# Patient Record
Sex: MALE | NOT HISPANIC OR LATINO | Employment: OTHER | URBAN - METROPOLITAN AREA
[De-identification: names, ages, dates, MRNs, and addresses within clinical notes are randomized per-mention and may not be internally consistent; named-entity substitution may affect disease eponyms.]

---

## 2018-05-08 ENCOUNTER — OFFICE VISIT (OUTPATIENT)
Dept: GASTROENTEROLOGY | Facility: CLINIC | Age: 63
End: 2018-05-08

## 2018-05-08 VITALS
WEIGHT: 199.6 LBS | HEIGHT: 67 IN | DIASTOLIC BLOOD PRESSURE: 88 MMHG | TEMPERATURE: 97.8 F | BODY MASS INDEX: 31.33 KG/M2 | SYSTOLIC BLOOD PRESSURE: 142 MMHG

## 2018-05-08 DIAGNOSIS — R12 HEARTBURN: Primary | ICD-10-CM

## 2018-05-08 DIAGNOSIS — R11.2 NON-INTRACTABLE VOMITING WITH NAUSEA, UNSPECIFIED VOMITING TYPE: ICD-10-CM

## 2018-05-08 DIAGNOSIS — R68.81 EARLY SATIETY: ICD-10-CM

## 2018-05-08 PROCEDURE — 99204 OFFICE O/P NEW MOD 45 MIN: CPT | Performed by: INTERNAL MEDICINE

## 2018-05-08 RX ORDER — CELECOXIB 200 MG/1
CAPSULE ORAL
COMMUNITY
Start: 2017-08-30

## 2018-05-08 RX ORDER — LANSOPRAZOLE 30 MG/1
30 CAPSULE, DELAYED RELEASE ORAL
COMMUNITY
Start: 2017-08-28 | End: 2018-06-07 | Stop reason: HOSPADM

## 2018-05-08 NOTE — PROGRESS NOTES
Chief Complaint   Patient presents with   • Heartburn       Pastor Marion is a 63 y.o. male who presents with GERD refractory to once a day Prevacid.  History of esophagitis in 10 years ago    C/s 3  years ago normal      Heartburn   He complains of abdominal pain, belching, chest pain, coughing, early satiety, heartburn, a hoarse voice, nausea and water brash. This is a chronic (Worsening recently) problem. The current episode started more than 1 year ago. The problem occurs frequently. The problem has been gradually worsening. The heartburn duration is an hour. The heartburn is located in the substernum. The heartburn is of moderate intensity. The heartburn wakes him from sleep. The heartburn limits his activity. The heartburn changes with position. The symptoms are aggravated by certain foods, exertion, lying down and bending. Pertinent negatives include no anemia, fatigue, melena or weight loss. There are no known risk factors. He has tried head elevation, a diet change, an antacid and a PPI for the symptoms. The treatment provided mild relief. Past procedures include an EGD. EGD 10 years ago with esophagitis and hiatal hernia. Past invasive treatments do not include reflux surgery.       Past Medical History:   Diagnosis Date   • Arthritis    • Heartburn        Past Surgical History:   Procedure Laterality Date   • CARPAL TUNNEL RELEASE     • FACIAL FRACTURE SURGERY     • HAND SURGERY     • HERNIA REPAIR     • SHOULDER SURGERY Right    • TOTAL HIP ARTHROPLASTY           Current Outpatient Prescriptions:   •  aspirin 81 MG tablet, Take 81 mg by mouth., Disp: , Rfl:   •  celecoxib (CeleBREX) 200 MG capsule, TAKE 1 CAPSULE TWICE A DAY (USE SPARINGLY), Disp: , Rfl:   •  lansoprazole (PREVACID) 30 MG capsule, Take 30 mg by mouth., Disp: , Rfl:     No Known Allergies    Social History     Social History   • Marital status:      Spouse name: N/A   • Number of children: N/A   • Years of education: N/A      Occupational History   • Not on file.     Social History Main Topics   • Smoking status: Never Smoker   • Smokeless tobacco: Not on file   • Alcohol use Not on file   • Drug use: Unknown   • Sexual activity: Not on file     Other Topics Concern   • Not on file     Social History Narrative   • No narrative on file       History reviewed. No pertinent family history.    Review of Systems   Constitutional: Negative for fatigue and weight loss.   HENT: Positive for hoarse voice.    Respiratory: Positive for cough.    Cardiovascular: Positive for chest pain.   Gastrointestinal: Positive for abdominal pain, heartburn, nausea and vomiting. Negative for anal bleeding, blood in stool, constipation, diarrhea and melena.   All other systems reviewed and are negative.      Vitals:    05/08/18 1042   BP: 142/88   Temp: 97.8 °F (36.6 °C)       Physical Exam   Constitutional: He is oriented to person, place, and time. He appears well-developed and well-nourished.   HENT:   Head: Normocephalic and atraumatic.   Eyes: Conjunctivae and EOM are normal.   Neck: Normal range of motion. No tracheal deviation present.   Cardiovascular: Normal rate and regular rhythm.    Pulmonary/Chest: Effort normal and breath sounds normal. No respiratory distress.   Abdominal: Soft. Bowel sounds are normal. He exhibits no distension and no mass. There is no tenderness. There is no rebound and no guarding.   Musculoskeletal: Normal range of motion.   Neurological: He is alert and oriented to person, place, and time.   Skin: Skin is warm and dry.   Psychiatric: He has a normal mood and affect. Judgment normal.   Nursing note and vitals reviewed.      No images are attached to the encounter.    Problem list    GERD refractory to lansoprazole every morning  Esophagitis 10 years ago  Hiatal hernia      Assessment/Plan    An for EGD in the near future  He will have a stress tests by the cardiology team at week we will await the results of that before  performing EGD  EGD 1 month  Twice a day PPI to be used for the next month I've given him samples of dexilant 60 mg for his evening dose    I have also given him a coupon card we may try to prescribe dexilant to him in the near future as he tells me on once a day dexilant he was symptom-free last year

## 2018-06-07 ENCOUNTER — ANESTHESIA (OUTPATIENT)
Dept: GASTROENTEROLOGY | Facility: HOSPITAL | Age: 63
End: 2018-06-07

## 2018-06-07 ENCOUNTER — ANESTHESIA EVENT (OUTPATIENT)
Dept: GASTROENTEROLOGY | Facility: HOSPITAL | Age: 63
End: 2018-06-07

## 2018-06-07 ENCOUNTER — HOSPITAL ENCOUNTER (OUTPATIENT)
Facility: HOSPITAL | Age: 63
Setting detail: HOSPITAL OUTPATIENT SURGERY
Discharge: HOME | End: 2018-06-07
Attending: INTERNAL MEDICINE | Admitting: INTERNAL MEDICINE

## 2018-06-07 VITALS
HEART RATE: 67 BPM | BODY MASS INDEX: 30.45 KG/M2 | SYSTOLIC BLOOD PRESSURE: 114 MMHG | DIASTOLIC BLOOD PRESSURE: 75 MMHG | WEIGHT: 194 LBS | OXYGEN SATURATION: 98 % | RESPIRATION RATE: 16 BRPM | TEMPERATURE: 98.3 F | HEIGHT: 67 IN

## 2018-06-07 PROCEDURE — 43235 EGD DIAGNOSTIC BRUSH WASH: CPT | Performed by: INTERNAL MEDICINE

## 2018-06-07 PROCEDURE — 25010000002 PROPOFOL 10 MG/ML EMULSION: Performed by: ANESTHESIOLOGY

## 2018-06-07 RX ORDER — LIDOCAINE HYDROCHLORIDE 20 MG/ML
INJECTION, SOLUTION INFILTRATION; PERINEURAL AS NEEDED
Status: DISCONTINUED | OUTPATIENT
Start: 2018-06-07 | End: 2018-06-07 | Stop reason: SURG

## 2018-06-07 RX ORDER — DEXLANSOPRAZOLE 60 MG/1
60 CAPSULE, DELAYED RELEASE ORAL DAILY
COMMUNITY
End: 2018-06-07 | Stop reason: HOSPADM

## 2018-06-07 RX ORDER — PROPOFOL 10 MG/ML
VIAL (ML) INTRAVENOUS CONTINUOUS PRN
Status: DISCONTINUED | OUTPATIENT
Start: 2018-06-07 | End: 2018-06-07 | Stop reason: SURG

## 2018-06-07 RX ORDER — SODIUM CHLORIDE, SODIUM LACTATE, POTASSIUM CHLORIDE, CALCIUM CHLORIDE 600; 310; 30; 20 MG/100ML; MG/100ML; MG/100ML; MG/100ML
30 INJECTION, SOLUTION INTRAVENOUS CONTINUOUS PRN
Status: DISCONTINUED | OUTPATIENT
Start: 2018-06-07 | End: 2018-06-07 | Stop reason: HOSPADM

## 2018-06-07 RX ORDER — PANTOPRAZOLE SODIUM 40 MG/1
40 TABLET, DELAYED RELEASE ORAL 2 TIMES DAILY
Qty: 60 TABLET | Refills: 3 | Status: SHIPPED | OUTPATIENT
Start: 2018-06-07

## 2018-06-07 RX ORDER — PROPOFOL 10 MG/ML
VIAL (ML) INTRAVENOUS AS NEEDED
Status: DISCONTINUED | OUTPATIENT
Start: 2018-06-07 | End: 2018-06-07 | Stop reason: SURG

## 2018-06-07 RX ADMIN — PROPOFOL 100 MG: 10 INJECTION, EMULSION INTRAVENOUS at 08:55

## 2018-06-07 RX ADMIN — PROPOFOL 100 MCG/KG/MIN: 10 INJECTION, EMULSION INTRAVENOUS at 08:55

## 2018-06-07 RX ADMIN — LIDOCAINE HYDROCHLORIDE 60 MG: 20 INJECTION, SOLUTION INFILTRATION; PERINEURAL at 08:55

## 2018-06-07 RX ADMIN — SODIUM CHLORIDE, POTASSIUM CHLORIDE, SODIUM LACTATE AND CALCIUM CHLORIDE 30 ML/HR: 600; 310; 30; 20 INJECTION, SOLUTION INTRAVENOUS at 08:03

## 2018-06-07 NOTE — ANESTHESIA PREPROCEDURE EVALUATION
Anesthesia Evaluation     Patient summary reviewed and Nursing notes reviewed                Airway   Mallampati: I  TM distance: <3 FB  Neck ROM: full  no difficulty expected  Dental - normal exam     Pulmonary - negative pulmonary ROS and normal exam   Cardiovascular - negative cardio ROS and normal exam        Neuro/Psych- negative ROS  GI/Hepatic/Renal/Endo - negative ROS     Musculoskeletal     Abdominal  - normal exam    Bowel sounds: normal.   Substance History - negative use     OB/GYN negative ob/gyn ROS         Other   (+) arthritis                     Anesthesia Plan    ASA 2     MAC     Anesthetic plan and risks discussed with patient.

## 2018-06-07 NOTE — ANESTHESIA POSTPROCEDURE EVALUATION
Patient: Pastor Marion    Procedure Summary     Date:  06/07/18 Room / Location:   [facility]    Anesthesia Start:  0856 Anesthesia Stop:  0917    Procedure:  ESOPHAGOGASTRODUODENOSCOPY (N/A Esophagus) Diagnosis:       Heartburn      Early satiety      Non-intractable vomiting with nausea, unspecified vomiting type      (Heartburn [R12])      (Early satiety [R68.81])      (Non-intractable vomiting with nausea, unspecified vomiting type [R11.2])    Surgeon:  Farooq Damon MD Provider:  Rusty Sanchez MD    Anesthesia Type:  MAC ASA Status:  2          Anesthesia Type: MAC  Last vitals  BP   112/74 (06/07/18 0924)   Temp   36.8 °C (98.3 °F) (06/07/18 0924)   Pulse   69 (06/07/18 0924)   Resp   16 (06/07/18 0754)     SpO2   96 % (06/07/18 0924)     Post Anesthesia Care and Evaluation    Patient location during evaluation: PHASE II  Patient participation: complete - patient participated  Level of consciousness: awake and alert  Pain management: adequate  Airway patency: patent  Anesthetic complications: No anesthetic complications  PONV Status: none  Cardiovascular status: acceptable  Respiratory status: acceptable  Hydration status: acceptable

## (undated) DEVICE — TBG 02 CRUSH RESIST LF CLR 7FT

## (undated) DEVICE — CANN NASL CO2 TRULINK W/O2 A/

## (undated) DEVICE — Device: Brand: DEFENDO AIR/WATER/SUCTION AND BIOPSY VALVE

## (undated) DEVICE — BITEBLOCK OMNI BLOC

## (undated) DEVICE — TUBING, SUCTION, 1/4" X 10', STRAIGHT: Brand: MEDLINE